# Patient Record
Sex: FEMALE | Race: BLACK OR AFRICAN AMERICAN | NOT HISPANIC OR LATINO | Employment: FULL TIME | URBAN - METROPOLITAN AREA
[De-identification: names, ages, dates, MRNs, and addresses within clinical notes are randomized per-mention and may not be internally consistent; named-entity substitution may affect disease eponyms.]

---

## 2020-02-10 ENCOUNTER — OFFICE VISIT (OUTPATIENT)
Dept: SLEEP MEDICINE | Facility: HOSPITAL | Age: 35
End: 2020-02-10

## 2020-02-10 VITALS
BODY MASS INDEX: 27.09 KG/M2 | DIASTOLIC BLOOD PRESSURE: 58 MMHG | WEIGHT: 138 LBS | SYSTOLIC BLOOD PRESSURE: 109 MMHG | HEART RATE: 58 BPM | OXYGEN SATURATION: 98 % | HEIGHT: 60 IN

## 2020-02-10 DIAGNOSIS — R06.83 SNORING: Primary | ICD-10-CM

## 2020-02-10 PROCEDURE — G0463 HOSPITAL OUTPT CLINIC VISIT: HCPCS

## 2020-02-10 NOTE — PROGRESS NOTES
"Group: [facility]         CONSULT NOTE    Patient Identification:  Madelyn Oden  34 y.o.  female  1985  [id number]            Requesting physician: Unknown    Reason for Consultation: Snoring    CC:     History of Present Illness:  Very pleasant female accompanied by her boyfriend who reports severe snoring with witnessed apnea.  Patient reports feeling tired in the morning sleepy as the day progresses.  No alcohol abuse no parasomnias no significant restless leg symptoms reported.  She reports dry and sore throat in the morning.  Sleep schedule time to bed 9 PM gets up between 5 AM and 9 AM.  No night shift work.      Review of Systems  Kidney bladder negative blood in the urine  HEENT negative sores in the mouth  Musculoskeletal positive painful joints  General negative fever  Cardiopulmonary negative chest pain  Neuropsych negative dizziness  GI negative frequent heartburn  Skin negative rash  Endocrine negative always feeling too cold  Heme lymphatic negative swollen glands  Rest of the 12 point review of system negative  Past Medical History:  No past medical history on file.    Past Surgical History:  No past surgical history on file.     Home Meds:    (Not in a hospital admission)    Allergies:  Allergies not on file    Social History:   Social History     Socioeconomic History   • Marital status: Single     Spouse name: Not on file   • Number of children: Not on file   • Years of education: Not on file   • Highest education level: Not on file       Family History:  No family history on file.    Physical Exam:  /58   Pulse 58   Ht 152.4 cm (60\")   Wt 62.6 kg (138 lb)   SpO2 98%   BMI 26.95 kg/m²  Body mass index is 26.95 kg/m². 98% 62.6 kg (138 lb)  Physical Exam  Well-developed normal body habitus  Eyes normal conjunctive a pupils reactive to light  ENT Mallampati 2 normal nasal exam  Neck midline trachea no thyromegaly  Chest clear no labored breathing  CVs regular rate " rhythm no lower extremity edema  Abdomen soft nontender no paraspinal megaly  CNS intact normal sensory exam  Skin no rashes no nodules  Psych oriented to time place and person normal memory      LABS:  No results found for: CALCIUM, PHOS    No results found for: CKTOTAL, CKMB, CKMBINDEX, TROPONINI, TROPONINT                              No results found for: TSH  CrCl cannot be calculated (No successful lab value found.).         Imaging: I personally visualized the images of scans/x-rays performed within last 3 days.      Assessment:  Hypersomnia with snoring  Fatigue        Recommendations:  At this point female with snoring with hypersomnia possible witnessed apnea  Discussed pathophysiology consequences of untreated sleep apnea  Patient agreeable wishing to proceed for home sleep study  Sleep hygiene measures discussed in detail  She has an ideal body weight  Avoidance of alcohol at bedtime  Follow-up after home sleep study            Karuna Desai MD  2/10/2020  10:51 AM      Much of this encounter note is an electronic transcription/translation of spoken language to printed text using Dragon Software.

## 2020-03-04 ENCOUNTER — TELEPHONE (OUTPATIENT)
Dept: SLEEP MEDICINE | Facility: HOSPITAL | Age: 35
End: 2020-03-04

## 2020-03-17 ENCOUNTER — APPOINTMENT (OUTPATIENT)
Dept: SLEEP MEDICINE | Facility: HOSPITAL | Age: 35
End: 2020-03-17

## 2020-03-18 ENCOUNTER — TELEPHONE (OUTPATIENT)
Dept: SLEEP MEDICINE | Facility: HOSPITAL | Age: 35
End: 2020-03-18

## 2021-01-14 ENCOUNTER — OFFICE VISIT (OUTPATIENT)
Dept: SLEEP MEDICINE | Facility: HOSPITAL | Age: 36
End: 2021-01-14

## 2021-01-14 VITALS
BODY MASS INDEX: 25.72 KG/M2 | WEIGHT: 131 LBS | SYSTOLIC BLOOD PRESSURE: 109 MMHG | OXYGEN SATURATION: 98 % | HEIGHT: 60 IN | HEART RATE: 71 BPM | DIASTOLIC BLOOD PRESSURE: 53 MMHG

## 2021-01-14 DIAGNOSIS — R06.83 SNORING: ICD-10-CM

## 2021-01-14 DIAGNOSIS — G47.30 OBSERVED SLEEP APNEA: Primary | ICD-10-CM

## 2021-01-14 PROBLEM — G47.10 HYPERSOMNIA: Status: ACTIVE | Noted: 2021-01-14

## 2021-01-14 PROCEDURE — G0463 HOSPITAL OUTPT CLINIC VISIT: HCPCS

## 2021-01-14 PROCEDURE — 99203 OFFICE O/P NEW LOW 30 MIN: CPT | Performed by: INTERNAL MEDICINE

## 2021-01-14 NOTE — PROGRESS NOTES
[facility]  [address]  [address] [place] [address]  Phone   Fax       Tarah Oden  1985  35 y.o.  female      PCP:Provider, No Known    Type of service: Initial New Patient Office Visit  Date of service: 1/14/2021    Chief Complaint   Patient presents with   • Witnessed Apnea   • Snoring   • Fatigue       History of present illness;  Tarah Oden is a new patient for me and was seen today for sleep related problems.  She was evaluated a year ago by Dr. Desai, because of snoring and witnessed apneas and she was supposed to have a home sleep test.  Unfortunately after evaluation the Covid pandemic happened and she put it off did not get the test done.  As her symptoms continue to persist she is here for getting the test done.  She works as a realtor.    Patient gives the following sleep history.  Sleep schedule:  Bedtime: 10 PM  Wake time: 5 AM  Normally takes about 10-20 minutes to fall asleep  Average hours of sleep 7-8  Number of naps per day none  When patient wakes up still feels tired and not rested  Snoring yes  Witnessed apneas yes  Have you ever awakened gasping for breath, coughing, choking: No      History reviewed. No pertinent past medical history.     has no past surgical history on file.     Social history:  Shift work: No  Tobacco use: No  Alcohol use: No  Caffeinated drinks: 2 tea  Over-the-counter sleeping aid and medications: None  Narcotic medications: No    Family Hx  Family history of sleep apnea positive for sleep apnea  History reviewed. No pertinent family history.    Medications: reviewed  No current outpatient medications on file.    Review of systems:  [place] Sleepiness Scale: Total score: 6   Positive for snoring, witnessed apneas, fatigue and daytime excessive sleepiness,   Negative for shortness of breath, cough, wheezing, chest pain, nausea and vomiting, palpitation, swelling of feet:    Morning headaches: No  Awaken with  "sore throat or dry mouth : Yes  Leg jerking at night: No  Crawly feeling/urge sensation to move in the legs: No  Teeth grinding: No  Sleepwalking, nightmares, muscle weakness with laughing or anger,sleep paralysis: No  Nasal Congestion: No  Nocturia (how many times/night): 2-3  Memory Problem: No  Weight change, no    Physical exam:  Vitals:    01/14/21 1041   BP: 109/53   Pulse: 71   SpO2: 98%   Weight: 59.4 kg (131 lb)   Height: 152.4 cm (60\")    Body mass index is 25.58 kg/m².    HEENT: Head is atraumatic, normocephalic  Eyes: pupils are round equal and reacting to light and accommodation, conjunctiva normal  Nose: no nasal septal defects or deviation and the nasal passages are clear, no nasal polyps,  Throat: tonsils are not enlarged, tongue normal size, oral airway Mallampati class 2  NECK: No lymphadenopathy, trachea is in the midline, thyroid not enlarged  RESPIRATORY SYSTEM: Breath sounds are equal on both sides, there are no wheezes or crackles  CARDIOVASULAR SYSTEM: Heart sounds are regular rhythm and usha rate, there are no murmurs or thrills  ABDOMEN: Soft, no hepatosplenomegaly, no evidence of ascites  EXTREMITES: No cyanosis, clubbing or edema   NEUROLOGICAL SYSTEM: Oriented x 3, no gross motor defects, gait normal    Medical records and labs reviewed in [facility]     Assessment and plan:  · Sleep apnea unspecified, (G47.30) Patient's symptoms and examination is consistent with sleep apnea.  I have talked to the patient about the signs and symptoms of sleep apnea and consequences of untreated sleep apnea. Discussed the sleep testing. I have placed a order in epic for a home sleep test.  Patient will have a follow-up after this sleep test is done.   · Snoring secondary to sleep apnea  · Hypersomnia with [place] Sleepiness Scale of Total score: 6 due to sleep apnea.        Doyle Bear MD  Sleep Medicine  Medical Director, [facility]  1/14/2021     "

## 2021-02-10 ENCOUNTER — APPOINTMENT (OUTPATIENT)
Dept: SLEEP MEDICINE | Facility: HOSPITAL | Age: 36
End: 2021-02-10

## 2021-02-12 ENCOUNTER — HOSPITAL ENCOUNTER (OUTPATIENT)
Dept: SLEEP MEDICINE | Facility: HOSPITAL | Age: 36
Discharge: HOME | End: 2021-02-12
Admitting: INTERNAL MEDICINE

## 2021-02-12 DIAGNOSIS — R06.83 SNORING: ICD-10-CM

## 2021-02-12 DIAGNOSIS — G47.30 OBSERVED SLEEP APNEA: ICD-10-CM

## 2021-02-12 PROCEDURE — 95806 SLEEP STUDY UNATT&RESP EFFT: CPT

## 2021-03-04 ENCOUNTER — OFFICE VISIT (OUTPATIENT)
Dept: SLEEP MEDICINE | Facility: HOSPITAL | Age: 36
End: 2021-03-04

## 2021-03-04 VITALS
HEART RATE: 65 BPM | WEIGHT: 133 LBS | OXYGEN SATURATION: 98 % | HEIGHT: 60 IN | DIASTOLIC BLOOD PRESSURE: 57 MMHG | SYSTOLIC BLOOD PRESSURE: 102 MMHG | BODY MASS INDEX: 26.11 KG/M2

## 2021-03-04 DIAGNOSIS — R06.83 SNORING: Primary | ICD-10-CM

## 2021-03-04 PROBLEM — G47.30 OBSERVED SLEEP APNEA: Status: RESOLVED | Noted: 2021-01-14 | Resolved: 2021-03-04

## 2021-03-04 PROBLEM — G47.10 HYPERSOMNIA: Status: RESOLVED | Noted: 2021-01-14 | Resolved: 2021-03-04

## 2021-03-04 PROCEDURE — G0463 HOSPITAL OUTPT CLINIC VISIT: HCPCS

## 2021-03-04 PROCEDURE — 99212 OFFICE O/P EST SF 10 MIN: CPT | Performed by: INTERNAL MEDICINE

## 2021-03-04 NOTE — PROGRESS NOTES
"  [facility]  [address]  [address] [place] [address]  Phone   Fax       SLEEP CLINIC FOLLOW UP PROGRESS NOTE.    Hermannelsa Oden  1985  35 y.o.  female      PCP: Provider, No Known      Date of visit: 3/4/2021    Chief Complaint   Patient presents with   • Snoring       INTERM HISTORY:  This is a 35 y.o. years old patient who has a history of snoring  Sleep schedule  Normally goes to bed at 10 PM  Wakes up at 6 a.m.    She had a home sleep test.  The home sleep test did not show any evidence of sleep apnea but did show mild snoring.  Patient is here to discuss the test results      History reviewed. No pertinent past medical history.    MEDICATIONS: reviewed by me  No current outpatient medications on file.    No Known Allergies reviewed by me    SOCIAL, FAMILY HISTORY: Medical records are reviewed and noted by me.  History of smoking no  History of alcohol use no  Caffeine use 1-2    REVIEW OF SYSTEMS:   [place] Sleepiness Scale :Total score: 6   Snoring: y  Morning headache: no      PHYSICAL EXAMINATION:  Vitals:    03/04/21 0834   BP: 102/57   Pulse: 65   SpO2: 98%   Weight: 60.3 kg (133 lb)   Height: 152.4 cm (60\")    Body mass index is 25.97 kg/m².    HEENT: pupils are round equal and reacting to light and accommodation, nasal passage is clear, no nasal polyps, no lymphadenopathy, throat is clear, oral airway Mallampati class 2  RESPRATORY SYSTEM: Breath sounds are equal on both sides and are normal, no wheezes or crackles  CARDIOVASULAR SYSTEM: Heart rate is regular without murmur  ABDOMEN: Soft, no ascites, no hepatosplenomegaly.  EXTREMITIES: No cyanosis, clubbing or edema       ASSESSMENT AND PLAN:  Snoring R 06.83 /upper airway resistance syndrome G 47.39  Patient is advised to lose weight  Advised to sleep on the side, patient can use body pillow to prevent rollover  Sleep hygiene, with the regular sleep time and wake time  Advised to avoid alcohol " and other sedative medications  Patient can also use oral appliance          Doyle Bear MD  Sleep Medicine  Medical Director for [facility]  3/4/2021